# Patient Record
Sex: MALE | Race: WHITE | NOT HISPANIC OR LATINO | Employment: FULL TIME | URBAN - METROPOLITAN AREA
[De-identification: names, ages, dates, MRNs, and addresses within clinical notes are randomized per-mention and may not be internally consistent; named-entity substitution may affect disease eponyms.]

---

## 2018-11-06 ENCOUNTER — HISTORICAL (OUTPATIENT)
Dept: ADMINISTRATIVE | Facility: HOSPITAL | Age: 48
End: 2018-11-06

## 2018-11-06 LAB
ABS NEUT (OLG): 5.3
ALBUMIN SERPL-MCNC: 4.5 GM/DL (ref 3.4–5)
ALBUMIN/GLOB SERPL: 1.61 {RATIO} (ref 1.5–2.5)
ALP SERPL-CCNC: 49 UNIT/L (ref 38–126)
ALT SERPL-CCNC: 20 UNIT/L (ref 7–52)
AST SERPL-CCNC: 17 UNIT/L (ref 15–37)
BILIRUB SERPL-MCNC: 0.7 MG/DL (ref 0.2–1)
BILIRUBIN DIRECT+TOT PNL SERPL-MCNC: 0.2 MG/DL (ref 0–0.5)
BILIRUBIN DIRECT+TOT PNL SERPL-MCNC: 0.5 MG/DL
BUN SERPL-MCNC: 24 MG/DL (ref 7–18)
CALCIUM SERPL-MCNC: 9.2 MG/DL (ref 8.5–10)
CHLORIDE SERPL-SCNC: 104 MMOL/L (ref 98–107)
CHOLEST SERPL-MCNC: 209 MG/DL (ref 0–200)
CHOLEST/HDLC SERPL: 4.8 {RATIO}
CO2 SERPL-SCNC: 27 MMOL/L (ref 21–32)
CREAT SERPL-MCNC: 1.05 MG/DL (ref 0.6–1.3)
ERYTHROCYTE [DISTWIDTH] IN BLOOD BY AUTOMATED COUNT: 12.3 % (ref 11.5–17)
EST. AVERAGE GLUCOSE BLD GHB EST-MCNC: 103 MG/DL
GLOBULIN SER-MCNC: 2.8 GM/DL (ref 1.2–3)
GLUCOSE SERPL-MCNC: 123 MG/DL (ref 74–106)
HBA1C MFR BLD: 5.2 % (ref 4.4–6.4)
HCT VFR BLD AUTO: 47.4 % (ref 42–52)
HDLC SERPL-MCNC: 44 MG/DL (ref 35–60)
HGB BLD-MCNC: 15.6 GM/DL (ref 14–18)
LDLC SERPL CALC-MCNC: 129 MG/DL (ref 0–129)
LYMPHOCYTES # BLD AUTO: 2.6 X10(3)/MCL (ref 0.6–3.4)
LYMPHOCYTES NFR BLD AUTO: 28.9 % (ref 13–40)
MCH RBC QN AUTO: 31.5 PG (ref 27–31.2)
MCHC RBC AUTO-ENTMCNC: 33 GM/DL (ref 32–36)
MCV RBC AUTO: 96 FL (ref 80–94)
MONOCYTES # BLD AUTO: 1 X10(3)/MCL (ref 0–1.8)
MONOCYTES NFR BLD AUTO: 10.8 % (ref 0.1–24)
NEUTROPHILS NFR BLD AUTO: 60.3 % (ref 47–80)
PLATELET # BLD AUTO: 244 X10(3)/MCL (ref 130–400)
PMV BLD AUTO: 7.7 FL
POTASSIUM SERPL-SCNC: 4.7 MMOL/L (ref 3.5–5.1)
PROT SERPL-MCNC: 7.3 GM/DL (ref 6.4–8.2)
PSA SERPL-MCNC: 0 NG/ML (ref 0–2.5)
RBC # BLD AUTO: 4.95 X10(6)/MCL (ref 4.7–6.1)
SODIUM SERPL-SCNC: 137 MMOL/L (ref 136–145)
TRIGL SERPL-MCNC: 212 MG/DL (ref 30–150)
TSH SERPL-ACNC: 0.02 MIU/ML (ref 0.35–4.94)
VLDLC SERPL CALC-MCNC: 42.4 MG/DL
WBC # SPEC AUTO: 8.9 X10(3)/MCL (ref 4.5–11.5)

## 2019-02-04 ENCOUNTER — HISTORICAL (OUTPATIENT)
Dept: ADMINISTRATIVE | Facility: HOSPITAL | Age: 49
End: 2019-02-04

## 2019-02-04 LAB
APPEARANCE, UA: CLEAR
BACTERIA #/AREA URNS AUTO: NORMAL /HPF
BILIRUB UR QL STRIP: NEGATIVE MG/DL
CHOLEST SERPL-MCNC: 225 MG/DL (ref 0–200)
CHOLEST/HDLC SERPL: 4.5 {RATIO}
COLOR UR: YELLOW
GLUCOSE (UA): NEGATIVE MG/DL
HDLC SERPL-MCNC: 50 MG/DL (ref 35–60)
HGB UR QL STRIP: NEGATIVE UNIT/L
KETONES UR QL STRIP: NEGATIVE MG/DL
LDLC SERPL CALC-MCNC: 141 MG/DL (ref 0–129)
LEUKOCYTE ESTERASE UR QL STRIP: NEGATIVE UNIT/L
NITRITE UR QL STRIP.AUTO: NEGATIVE
PH UR STRIP: 6 [PH]
PROT UR QL STRIP: NEGATIVE MG/DL
RBC #/AREA URNS HPF: NORMAL /HPF
SP GR UR STRIP: 1.02
SQUAMOUS EPITHELIAL, UA: NORMAL /LPF
T3FREE SERPL-MCNC: 2.91 PG/ML (ref 1.45–3.48)
T4 FREE SERPL-MCNC: 0.88 NG/DL (ref 0.76–1.46)
TRIGL SERPL-MCNC: 207 MG/DL (ref 30–150)
TSH SERPL-ACNC: 1.97 MIU/ML (ref 0.35–4.94)
UROBILINOGEN UR STRIP-ACNC: 0.2 MG/DL
VLDLC SERPL CALC-MCNC: 41.4 MG/DL
WBC #/AREA URNS AUTO: NORMAL /[HPF]

## 2020-01-29 ENCOUNTER — HISTORICAL (OUTPATIENT)
Dept: ADMINISTRATIVE | Facility: HOSPITAL | Age: 50
End: 2020-01-29

## 2020-01-29 LAB
ABS NEUT (OLG): 6.4 X10(3)/MCL (ref 2.1–9.2)
ALBUMIN SERPL-MCNC: 5 GM/DL (ref 3.4–5)
ALBUMIN/GLOB SERPL: 1.79 {RATIO} (ref 1.5–2.5)
ALP SERPL-CCNC: 44 UNIT/L (ref 38–126)
ALT SERPL-CCNC: 22 UNIT/L (ref 7–52)
APPEARANCE, UA: CLEAR
AST SERPL-CCNC: 20 UNIT/L (ref 15–37)
BACTERIA #/AREA URNS AUTO: NORMAL /HPF
BILIRUB SERPL-MCNC: 0.7 MG/DL (ref 0.2–1)
BILIRUB UR QL STRIP: NEGATIVE MG/DL
BILIRUBIN DIRECT+TOT PNL SERPL-MCNC: 0.1 MG/DL (ref 0–0.5)
BILIRUBIN DIRECT+TOT PNL SERPL-MCNC: 0.6 MG/DL
BUN SERPL-MCNC: 20 MG/DL (ref 7–18)
CALCIUM SERPL-MCNC: 9.5 MG/DL (ref 8.5–10)
CHLORIDE SERPL-SCNC: 100 MMOL/L (ref 98–107)
CHOLEST SERPL-MCNC: 201 MG/DL (ref 0–200)
CHOLEST/HDLC SERPL: 3.3 {RATIO}
CO2 SERPL-SCNC: 29 MMOL/L (ref 21–32)
COLOR UR: YELLOW
CREAT SERPL-MCNC: 0.95 MG/DL (ref 0.6–1.3)
DEPRECATED CALCIDIOL+CALCIFEROL SERPL-MC: 32.3 NG/ML (ref 30–80)
ERYTHROCYTE [DISTWIDTH] IN BLOOD BY AUTOMATED COUNT: 12.1 % (ref 11.5–17)
GLOBULIN SER-MCNC: 2.8 GM/DL (ref 1.2–3)
GLUCOSE (UA): NEGATIVE MG/DL
GLUCOSE SERPL-MCNC: 109 MG/DL (ref 74–106)
HCT VFR BLD AUTO: 46.7 % (ref 42–52)
HDLC SERPL-MCNC: 61 MG/DL (ref 35–60)
HGB BLD-MCNC: 15.7 GM/DL (ref 14–18)
HGB UR QL STRIP: NEGATIVE UNIT/L
KETONES UR QL STRIP: NORMAL MG/DL
LDLC SERPL CALC-MCNC: 119 MG/DL (ref 0–129)
LEUKOCYTE ESTERASE UR QL STRIP: NEGATIVE UNIT/L
LYMPHOCYTES # BLD AUTO: 2.7 X10(3)/MCL (ref 0.6–3.4)
LYMPHOCYTES NFR BLD AUTO: 27.4 % (ref 13–40)
MCH RBC QN AUTO: 30.4 PG (ref 27–31.2)
MCHC RBC AUTO-ENTMCNC: 34 GM/DL (ref 32–36)
MCV RBC AUTO: 90 FL (ref 80–94)
MONOCYTES # BLD AUTO: 0.8 X10(3)/MCL (ref 0.1–1.3)
MONOCYTES NFR BLD AUTO: 8 % (ref 0.1–24)
NEUTROPHILS NFR BLD AUTO: 64.6 % (ref 47–80)
NITRITE UR QL STRIP.AUTO: NEGATIVE
PH UR STRIP: 6.5 [PH]
PLATELET # BLD AUTO: 286 X10(3)/MCL (ref 130–400)
PMV BLD AUTO: 7.6 FL (ref 9.4–12.4)
POTASSIUM SERPL-SCNC: 5 MMOL/L (ref 3.5–5.1)
PROT SERPL-MCNC: 7.8 GM/DL (ref 6.4–8.2)
PROT UR QL STRIP: NEGATIVE MG/DL
PSA SERPL-MCNC: 0.9 NG/ML (ref 0–2.5)
RBC # BLD AUTO: 5.16 X10(6)/MCL (ref 4.7–6.1)
RBC #/AREA URNS HPF: NORMAL /HPF
SODIUM SERPL-SCNC: 137 MMOL/L (ref 136–145)
SP GR UR STRIP: 1.02
SQUAMOUS EPITHELIAL, UA: NORMAL /LPF
TESTOST SERPL-MCNC: 254 NG/DL (ref 300–1060)
TRIGL SERPL-MCNC: 114 MG/DL (ref 30–150)
TSH SERPL-ACNC: 1.43 MIU/ML (ref 0.35–4.94)
UROBILINOGEN UR STRIP-ACNC: 0.2 MG/DL
VLDLC SERPL CALC-MCNC: 22.8 MG/DL
WBC # SPEC AUTO: 9.9 X10(3)/MCL (ref 4.5–11.5)
WBC #/AREA URNS AUTO: NORMAL /[HPF]

## 2020-08-20 ENCOUNTER — HISTORICAL (OUTPATIENT)
Dept: ADMINISTRATIVE | Facility: HOSPITAL | Age: 50
End: 2020-08-20

## 2020-08-20 LAB
ALBUMIN SERPL-MCNC: 4.6 GM/DL (ref 3.4–5)
ALBUMIN/GLOB SERPL: 2 {RATIO} (ref 1.5–2.5)
ALP SERPL-CCNC: 41 UNIT/L (ref 38–126)
ALT SERPL-CCNC: 24 UNIT/L (ref 7–52)
AST SERPL-CCNC: 20 UNIT/L (ref 15–37)
BILIRUB SERPL-MCNC: 0.7 MG/DL (ref 0.2–1)
BILIRUBIN DIRECT+TOT PNL SERPL-MCNC: 0.2 MG/DL (ref 0–0.5)
BILIRUBIN DIRECT+TOT PNL SERPL-MCNC: 0.5 MG/DL
BUN SERPL-MCNC: 20 MG/DL (ref 7–18)
CALCIUM SERPL-MCNC: 9.3 MG/DL (ref 8.5–10.1)
CHLORIDE SERPL-SCNC: 103 MMOL/L (ref 98–107)
CO2 SERPL-SCNC: 26 MMOL/L (ref 21–32)
CREAT SERPL-MCNC: 1.07 MG/DL (ref 0.6–1.3)
DEPRECATED CALCIDIOL+CALCIFEROL SERPL-MC: 57.5 NG/ML (ref 30–80)
GLOBULIN SER-MCNC: 2.3 GM/DL (ref 1.2–3)
GLUCOSE SERPL-MCNC: 119 MG/DL (ref 74–106)
POTASSIUM SERPL-SCNC: 4.8 MMOL/L (ref 3.5–5.1)
PROT SERPL-MCNC: 6.9 GM/DL (ref 6.4–8.2)
SODIUM SERPL-SCNC: 136 MMOL/L (ref 136–145)

## 2020-08-26 ENCOUNTER — HISTORICAL (OUTPATIENT)
Dept: ADMINISTRATIVE | Facility: HOSPITAL | Age: 50
End: 2020-08-26

## 2020-08-26 LAB — TESTOST SERPL-MCNC: 280 NG/DL (ref 300–1060)

## 2020-11-12 ENCOUNTER — HISTORICAL (OUTPATIENT)
Dept: ADMINISTRATIVE | Facility: HOSPITAL | Age: 50
End: 2020-11-12

## 2020-11-12 LAB
ABS NEUT (OLG): 5.4 X10(3)/MCL (ref 2.1–9.2)
ALBUMIN SERPL-MCNC: 4.4 GM/DL (ref 3.4–5)
ALBUMIN/GLOB SERPL: 1.76 {RATIO} (ref 1.5–2.5)
ALP SERPL-CCNC: 48 UNIT/L (ref 38–126)
ALT SERPL-CCNC: 29 UNIT/L (ref 7–52)
AST SERPL-CCNC: 22 UNIT/L (ref 15–37)
BILIRUB SERPL-MCNC: 0.4 MG/DL (ref 0.2–1)
BILIRUBIN DIRECT+TOT PNL SERPL-MCNC: 0.1 MG/DL (ref 0–0.5)
BILIRUBIN DIRECT+TOT PNL SERPL-MCNC: 0.3 MG/DL
BUN SERPL-MCNC: 20 MG/DL (ref 7–18)
CALCIUM SERPL-MCNC: 9.2 MG/DL (ref 8.5–10.1)
CHLORIDE SERPL-SCNC: 104 MMOL/L (ref 98–107)
CHOLEST SERPL-MCNC: 184 MG/DL (ref 0–200)
CHOLEST/HDLC SERPL: 4.6 {RATIO}
CO2 SERPL-SCNC: 24 MMOL/L (ref 21–32)
CREAT SERPL-MCNC: 0.85 MG/DL (ref 0.6–1.3)
ERYTHROCYTE [DISTWIDTH] IN BLOOD BY AUTOMATED COUNT: 12.3 % (ref 11.5–17)
GLOBULIN SER-MCNC: 2.5 GM/DL (ref 1.2–3)
GLUCOSE SERPL-MCNC: 100 MG/DL (ref 74–106)
HCT VFR BLD AUTO: 44.8 % (ref 42–52)
HDLC SERPL-MCNC: 40 MG/DL (ref 35–60)
HGB BLD-MCNC: 15 GM/DL (ref 14–18)
LDLC SERPL CALC-MCNC: 128 MG/DL (ref 0–129)
LYMPHOCYTES # BLD AUTO: 2.7 X10(3)/MCL (ref 0.6–3.4)
LYMPHOCYTES NFR BLD AUTO: 30.5 % (ref 13–40)
MCH RBC QN AUTO: 31 PG (ref 27–31.2)
MCHC RBC AUTO-ENTMCNC: 34 GM/DL (ref 32–36)
MCV RBC AUTO: 93 FL (ref 80–94)
MONOCYTES # BLD AUTO: 0.8 X10(3)/MCL (ref 0.1–1.3)
MONOCYTES NFR BLD AUTO: 8.7 % (ref 0.1–24)
NEUTROPHILS NFR BLD AUTO: 60.8 % (ref 47–80)
PLATELET # BLD AUTO: 264 X10(3)/MCL (ref 130–400)
PMV BLD AUTO: 7.8 FL (ref 9.4–12.4)
POTASSIUM SERPL-SCNC: 4.7 MMOL/L (ref 3.5–5.1)
PROT SERPL-MCNC: 6.9 GM/DL (ref 6.4–8.2)
RBC # BLD AUTO: 4.84 X10(6)/MCL (ref 4.7–6.1)
SODIUM SERPL-SCNC: 138 MMOL/L (ref 136–145)
TESTOST SERPL-MCNC: 809 NG/DL (ref 300–1060)
TRIGL SERPL-MCNC: 136 MG/DL (ref 30–150)
VLDLC SERPL CALC-MCNC: 27.2 MG/DL
WBC # SPEC AUTO: 8.9 X10(3)/MCL (ref 4.5–11.5)

## 2021-03-02 ENCOUNTER — HISTORICAL (OUTPATIENT)
Dept: ADMINISTRATIVE | Facility: HOSPITAL | Age: 51
End: 2021-03-02

## 2021-03-02 LAB
ABS NEUT (OLG): 5 X10(3)/MCL (ref 2.1–9.2)
ALBUMIN SERPL-MCNC: 4.2 GM/DL (ref 3.4–5)
ALBUMIN/GLOB SERPL: 1.75 {RATIO} (ref 1.5–2.5)
ALP SERPL-CCNC: 41 UNIT/L (ref 38–126)
ALT SERPL-CCNC: 27 UNIT/L (ref 7–52)
AST SERPL-CCNC: 27 UNIT/L (ref 15–37)
BILIRUB SERPL-MCNC: 0.7 MG/DL (ref 0.2–1)
BILIRUBIN DIRECT+TOT PNL SERPL-MCNC: 0.1 MG/DL (ref 0–0.5)
BILIRUBIN DIRECT+TOT PNL SERPL-MCNC: 0.6 MG/DL
BUN SERPL-MCNC: 18 MG/DL (ref 7–18)
CALCIUM SERPL-MCNC: 9.7 MG/DL (ref 8.5–10.1)
CHLORIDE SERPL-SCNC: 100 MMOL/L (ref 98–107)
CHOLEST SERPL-MCNC: 169 MG/DL (ref 0–200)
CHOLEST/HDLC SERPL: 3.9 {RATIO}
CO2 SERPL-SCNC: 26 MMOL/L (ref 21–32)
CREAT SERPL-MCNC: 1.1 MG/DL (ref 0.6–1.3)
DEPRECATED CALCIDIOL+CALCIFEROL SERPL-MC: 28.7 NG/ML (ref 30–80)
ERYTHROCYTE [DISTWIDTH] IN BLOOD BY AUTOMATED COUNT: 12.8 % (ref 11.5–17)
GLOBULIN SER-MCNC: 2.4 GM/DL (ref 1.2–3)
GLUCOSE SERPL-MCNC: 100 MG/DL (ref 74–106)
HCT VFR BLD AUTO: 46.2 % (ref 42–52)
HDLC SERPL-MCNC: 43 MG/DL (ref 35–60)
HGB BLD-MCNC: 15.3 GM/DL (ref 14–18)
LDLC SERPL CALC-MCNC: 107 MG/DL (ref 0–129)
LYMPHOCYTES # BLD AUTO: 2.7 X10(3)/MCL (ref 0.6–3.4)
LYMPHOCYTES NFR BLD AUTO: 32 % (ref 13–40)
MCH RBC QN AUTO: 30 PG (ref 27–31.2)
MCHC RBC AUTO-ENTMCNC: 33 GM/DL (ref 32–36)
MCV RBC AUTO: 91 FL (ref 80–94)
MONOCYTES # BLD AUTO: 0.6 X10(3)/MCL (ref 0.1–1.3)
MONOCYTES NFR BLD AUTO: 7.4 % (ref 0.1–24)
NEUTROPHILS NFR BLD AUTO: 60.6 % (ref 47–80)
PLATELET # BLD AUTO: 243 X10(3)/MCL (ref 130–400)
PMV BLD AUTO: 8.1 FL (ref 9.4–12.4)
POTASSIUM SERPL-SCNC: 4.7 MMOL/L (ref 3.5–5.1)
PROT SERPL-MCNC: 6.6 GM/DL (ref 6.4–8.2)
PSA SERPL-MCNC: 0.93 NG/ML (ref 0–3.5)
RBC # BLD AUTO: 5.1 X10(6)/MCL (ref 4.7–6.1)
SODIUM SERPL-SCNC: 136 MMOL/L (ref 136–145)
TESTOST SERPL-MCNC: 669 NG/DL (ref 300–1060)
TRIGL SERPL-MCNC: 124 MG/DL (ref 30–150)
TSH SERPL-ACNC: 1.73 MIU/ML (ref 0.35–4.94)
VLDLC SERPL CALC-MCNC: 24.8 MG/DL
WBC # SPEC AUTO: 8.3 X10(3)/MCL (ref 4.5–11.5)

## 2021-03-03 ENCOUNTER — HISTORICAL (OUTPATIENT)
Dept: ADMINISTRATIVE | Facility: HOSPITAL | Age: 51
End: 2021-03-03

## 2021-03-03 LAB
APPEARANCE, UA: CLEAR
BACTERIA #/AREA URNS AUTO: ABNORMAL /HPF
BILIRUB UR QL STRIP: ABNORMAL MG/DL
COLOR UR: YELLOW
GLUCOSE (UA): NEGATIVE MG/DL
HGB UR QL STRIP: ABNORMAL UNIT/L
KETONES UR QL STRIP: ABNORMAL MG/DL
LEUKOCYTE ESTERASE UR QL STRIP: NEGATIVE UNIT/L
NITRITE UR QL STRIP.AUTO: NEGATIVE
PH UR STRIP: 7 [PH]
PROT UR QL STRIP: NEGATIVE MG/DL
RBC #/AREA URNS HPF: ABNORMAL /HPF
SP GR UR STRIP: >1.03
SQUAMOUS EPITHELIAL, UA: ABNORMAL /LPF
UROBILINOGEN UR STRIP-ACNC: 1 MG/DL
WBC #/AREA URNS AUTO: ABNORMAL /[HPF]

## 2021-09-07 ENCOUNTER — HISTORICAL (OUTPATIENT)
Dept: ADMINISTRATIVE | Facility: HOSPITAL | Age: 51
End: 2021-09-07

## 2021-09-07 LAB
ABS NEUT (OLG): 5.3 X10(3)/MCL (ref 2.1–9.2)
ALBUMIN SERPL-MCNC: 4.5 GM/DL (ref 3.4–5)
ALBUMIN/GLOB SERPL: 1.67 {RATIO} (ref 1.5–2.5)
ALP SERPL-CCNC: 47 UNIT/L (ref 38–126)
ALT SERPL-CCNC: 19 UNIT/L (ref 7–52)
AST SERPL-CCNC: 18 UNIT/L (ref 15–37)
BILIRUB SERPL-MCNC: 0.5 MG/DL (ref 0.2–1)
BILIRUBIN DIRECT+TOT PNL SERPL-MCNC: 0.1 MG/DL (ref 0–0.5)
BILIRUBIN DIRECT+TOT PNL SERPL-MCNC: 0.4 MG/DL
BUN SERPL-MCNC: 22 MG/DL (ref 7–18)
CALCIUM SERPL-MCNC: 9.8 MG/DL (ref 8.5–10.1)
CHLORIDE SERPL-SCNC: 102 MMOL/L (ref 98–107)
CHOLEST SERPL-MCNC: 201 MG/DL (ref 0–200)
CHOLEST/HDLC SERPL: 3.9 {RATIO}
CO2 SERPL-SCNC: 27 MMOL/L (ref 21–32)
CREAT SERPL-MCNC: 0.95 MG/DL (ref 0.6–1.3)
DEPRECATED CALCIDIOL+CALCIFEROL SERPL-MC: 47.6 NG/ML (ref 30–80)
ERYTHROCYTE [DISTWIDTH] IN BLOOD BY AUTOMATED COUNT: 13 % (ref 11.5–17)
GLOBULIN SER-MCNC: 2.7 GM/DL (ref 1.2–3)
GLUCOSE SERPL-MCNC: 123 MG/DL (ref 74–106)
HCT VFR BLD AUTO: 44.5 % (ref 42–52)
HDLC SERPL-MCNC: 51 MG/DL (ref 35–60)
HGB BLD-MCNC: 15.3 GM/DL (ref 14–18)
LDLC SERPL CALC-MCNC: 118 MG/DL (ref 0–129)
LYMPHOCYTES # BLD AUTO: 2.1 X10(3)/MCL (ref 0.6–3.4)
LYMPHOCYTES NFR BLD AUTO: 26.2 % (ref 13–40)
MCH RBC QN AUTO: 31.4 PG (ref 27–31.2)
MCHC RBC AUTO-ENTMCNC: 34 GM/DL (ref 32–36)
MCV RBC AUTO: 91 FL (ref 80–94)
MONOCYTES # BLD AUTO: 0.8 X10(3)/MCL (ref 0.1–1.3)
MONOCYTES NFR BLD AUTO: 9.5 % (ref 0.1–24)
NEUTROPHILS NFR BLD AUTO: 64.3 % (ref 47–80)
PLATELET # BLD AUTO: 248 X10(3)/MCL (ref 130–400)
PMV BLD AUTO: 7.9 FL (ref 9.4–12.4)
POTASSIUM SERPL-SCNC: 5 MMOL/L (ref 3.5–5.1)
PROT SERPL-MCNC: 7.2 GM/DL (ref 6.4–8.2)
PSA SERPL-MCNC: 0.72 NG/ML (ref 0–3.5)
RBC # BLD AUTO: 4.87 X10(6)/MCL (ref 4.7–6.1)
SODIUM SERPL-SCNC: 138 MMOL/L (ref 136–145)
TESTOST SERPL-MCNC: 237 NG/DL (ref 300–1060)
TRIGL SERPL-MCNC: 198 MG/DL (ref 30–150)
VLDLC SERPL CALC-MCNC: 39.6 MG/DL
WBC # SPEC AUTO: 8.2 X10(3)/MCL (ref 4.5–11.5)

## 2021-11-15 ENCOUNTER — HISTORICAL (OUTPATIENT)
Dept: ADMINISTRATIVE | Facility: HOSPITAL | Age: 51
End: 2021-11-15

## 2021-11-15 LAB
ABS NEUT (OLG): 6.9 X10(3)/MCL (ref 2.1–9.2)
ALBUMIN SERPL-MCNC: 5.1 GM/DL (ref 3.4–5)
ALBUMIN/GLOB SERPL: 1.96 {RATIO} (ref 1.5–2.5)
ALP SERPL-CCNC: 50 UNIT/L (ref 38–126)
ALT SERPL-CCNC: 25 UNIT/L (ref 7–52)
AST SERPL-CCNC: 25 UNIT/L (ref 15–37)
BILIRUB SERPL-MCNC: 0.7 MG/DL (ref 0.2–1)
BILIRUBIN DIRECT+TOT PNL SERPL-MCNC: 0.2 MG/DL (ref 0–0.5)
BILIRUBIN DIRECT+TOT PNL SERPL-MCNC: 0.5 MG/DL
BUN SERPL-MCNC: 17 MG/DL (ref 7–18)
CALCIUM SERPL-MCNC: 10.2 MG/DL (ref 8.5–10.1)
CHLORIDE SERPL-SCNC: 102 MMOL/L (ref 98–107)
CO2 SERPL-SCNC: 26 MMOL/L (ref 21–32)
CREAT SERPL-MCNC: 0.88 MG/DL (ref 0.6–1.3)
ERYTHROCYTE [DISTWIDTH] IN BLOOD BY AUTOMATED COUNT: 12.1 % (ref 11.5–17)
GLOBULIN SER-MCNC: 2.6 GM/DL (ref 1.2–3)
GLUCOSE SERPL-MCNC: 107 MG/DL (ref 74–106)
HCT VFR BLD AUTO: 46 % (ref 42–52)
HGB BLD-MCNC: 15.3 GM/DL (ref 14–18)
LYMPHOCYTES # BLD AUTO: 2.6 X10(3)/MCL (ref 0.6–3.4)
LYMPHOCYTES NFR BLD AUTO: 25.2 % (ref 13–40)
MCH RBC QN AUTO: 30.7 PG (ref 27–31.2)
MCHC RBC AUTO-ENTMCNC: 33 GM/DL (ref 32–36)
MCV RBC AUTO: 92 FL (ref 80–94)
MONOCYTES # BLD AUTO: 0.7 X10(3)/MCL (ref 0.1–1.3)
MONOCYTES NFR BLD AUTO: 7.1 % (ref 0.1–24)
NEUTROPHILS NFR BLD AUTO: 67.7 % (ref 47–80)
PLATELET # BLD AUTO: 296 X10(3)/MCL (ref 130–400)
PMV BLD AUTO: 7.5 FL (ref 9.4–12.4)
POTASSIUM SERPL-SCNC: 5.1 MMOL/L (ref 3.5–5.1)
PROT SERPL-MCNC: 7.7 GM/DL (ref 6.4–8.2)
PSA SERPL-MCNC: 1.01 NG/ML (ref 0–3.5)
RBC # BLD AUTO: 4.99 X10(6)/MCL (ref 4.7–6.1)
SODIUM SERPL-SCNC: 139 MMOL/L (ref 136–145)
TESTOST SERPL-MCNC: 790 NG/DL (ref 300–1060)
WBC # SPEC AUTO: 10.2 X10(3)/MCL (ref 4.5–11.5)

## 2021-12-27 ENCOUNTER — HISTORICAL (OUTPATIENT)
Dept: ADMINISTRATIVE | Facility: HOSPITAL | Age: 51
End: 2021-12-27

## 2021-12-27 LAB
ALBUMIN SERPL-MCNC: 5 GM/DL (ref 3.4–5)
ALBUMIN/GLOB SERPL: 2 {RATIO} (ref 1.5–2.5)
ALP SERPL-CCNC: 49 UNIT/L (ref 38–126)
ALT SERPL-CCNC: 22 UNIT/L (ref 7–52)
AST SERPL-CCNC: 19 UNIT/L (ref 15–37)
BILIRUB SERPL-MCNC: 0.7 MG/DL (ref 0.2–1)
BILIRUBIN DIRECT+TOT PNL SERPL-MCNC: 0.2 MG/DL (ref 0–0.5)
BILIRUBIN DIRECT+TOT PNL SERPL-MCNC: 0.5 MG/DL
BUN SERPL-MCNC: 22 MG/DL (ref 7–18)
CALCIUM SERPL-MCNC: 10.1 MG/DL (ref 8.5–10.1)
CHLORIDE SERPL-SCNC: 102 MMOL/L (ref 98–107)
CO2 SERPL-SCNC: 28 MMOL/L (ref 21–32)
CREAT SERPL-MCNC: 0.9 MG/DL (ref 0.6–1.3)
EST CREAT CLEARANCE SER (OHS): 171.27 ML/MIN
GLOBULIN SER-MCNC: 2.5 GM/DL (ref 1.2–3)
GLUCOSE SERPL-MCNC: 100 MG/DL (ref 74–106)
POTASSIUM SERPL-SCNC: 4.7 MMOL/L (ref 3.5–5.1)
PROT SERPL-MCNC: 7.5 GM/DL (ref 6.4–8.2)
SODIUM SERPL-SCNC: 140 MMOL/L (ref 136–145)

## 2022-04-09 ENCOUNTER — HISTORICAL (OUTPATIENT)
Dept: ADMINISTRATIVE | Facility: HOSPITAL | Age: 52
End: 2022-04-09

## 2022-04-29 VITALS
SYSTOLIC BLOOD PRESSURE: 131 MMHG | BODY MASS INDEX: 35.28 KG/M2 | WEIGHT: 274.94 LBS | HEIGHT: 74 IN | DIASTOLIC BLOOD PRESSURE: 88 MMHG

## 2022-05-02 NOTE — HISTORICAL [facility]
This is a historical note converted from Ricarda. Formatting and pictures may have been removed.  Please reference Ricarda for original formatting and attached multimedia. Chief Complaint  6 MONTH FOLLOW UP  History of Present Illness  Patient here for six-month follow-up.? Denies any side effects to current medications.? Denies change in social or family history.? No new complaints.  /78 at home yesterday, has been running normal. Drank a Monster Drink this morning. Nicotine : 1/2 can dip a day.? No recent exercise due to COVID.  Still with work stress.?  Doesnt feel the Clomid helped.  Review of Systems  General:??????????????? Patient reports energy level is good.??Denies fever,chills, night sweats, fatigue or weakness.  HEENT:?????????????????? Denies vision changes or eye pain.? No sore throat, ear pain, sinus pressure or discharge.  Cardiovascular:??? Denies chest pain, palpitations, dyspnea on exertion, orthopnea.  Respiratory:???????? No cough, wheezing, shortness of breath, or sputum.  GI:????????????????????????? Denies nausea, emesis, constipation, diarrhea, melena, hematochezia or abdominal pain  :??????????????????????? No frequency, urgency, hematuria, discharge, or incontinence  MS:?????????????????????? Denies myalgias, arthralgias, joint effusion, edema, or weakness  Neuro:????????????????? No headaches, numbness in extremities, tingling, dizziness, or weakness  Psych:?????????????????? Denies anxiety, depression, suicidal or homicidal ideations, or irritability  Endo:??????????????????? Denies polyuria, polydipsia, polyphagia  Heme:????????????????? No abnormal bleeding or bruising. No lymph node enlargement or pain.  Physical Exam  Vitals & Measurements  HR:?98(Peripheral)? BP:?150/98?  HT:?187.00?cm? WT:?129.300?kg? BMI:?36.98?  138/88 on my check  General :?????Well-developed and? nourished, no apparent distress, alert and oriented ?4  HEENT:????? TMs and EACs within normal limits,?nasal  mucosa within normal limits?with no discharge,?oropharynx clear  Integument :????? Skin is intact with no erythema.? No pustules or vesicles.? No rash or scale. No Lymphadenopathy.? No urticaria.? No abnormal nevi  Neck :?????Supple, no lymphadenopathy, no thyromegaly, no bruits, no jugular venous distention  Cardiovascular :?????? Regular rhythm and rate, no murmurs, radial and dorsal pedal pulses 2+ bilaterally  Respiratory :??????Lungs clear to auscultation bilaterally, no wheezes, no crackles, no rhonchi.? Good air movement  Abdomen :?????? ?NABS, soft, nontender, no hepatosplenomegaly, no masses, no guarding or rebound????????????????????????  Ext:??????? No muscle tenderness, joints WNL, FROM, negative SLR, no?CCE  Neuro :? ?????? CN II-XII intact, reflexes 2+ throughout, no motor sensory deficits, negative?cerebellar? tests  Heme :?????? No bruising or petechiae?  Back:??????? no CVAT  Assessment/Plan  1.?HTN (hypertension)?I10  ?We will continue valsartan 160?for 6 months.? He will monitor blood pressure at home and contact us if it is consistently greater than 140/90?or if he is symptomatic.  2.?Hypertriglyceridemia?E78.1  ?He will continue omega-3s  3.?Anxiety?F41.9  Medication has been effective.??Refill duloxetine 60 mg.  4.?Vitamin D deficiency?E55.9  ?His vitamin D level?is? now in the desired  5.?Hypogonadism male?E29.1  We will check a?testosterone level.  He is encouraged to decrease?carbohydrate intake. ?We will check an A1c at his next CPX.  Referrals  Clinic Follow up, *Est. 03/03/21 8:00:00 CST, CPX in 6 months, Order for future visit, Hypogonadism male, [facility]  Clinic Follow-up PRN, 08/26/20 11:08:00 CDT, [facility], Future Order   Problem List/Past Medical History  Ongoing  can lie flat  can walk 2 blocks w/o CP or SOB  hearing loss (rt)  Hypertriglyceridemia  Low TSH level  Wellness examination  Historical  paralymphatic fistula repair right ear 2012  torn Lt ACL  repair  Procedure/Surgical History  Arthroscopically aided anterior cruciate ligament repair/augmentation or reconstruction. (05/22/2014)  Arthroscopy ACL Reconstruction (Left) (05/22/2014)  Injection of anesthetic into peripheral nerve for analgesia (05/22/2014)  Injection, anesthetic agent; femoral nerve, single. (05/22/2014)  Other repair of the cruciate ligaments (05/22/2014)  Paralymphatic fistula repair (2012)  Rt acl reconstruction (1991)   Medications  busPIRone 7.5 mg oral tablet, 7.5 mg= 1 tab(s), Oral, TID, PRN, 2 refills,? ?Not taking  clomiPHENE 50 mg oral tablet, 50 mg= 1 tab(s), Oral, Daily,? 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  Father: History is negative  Brother: History is negative  Immunizations  Vaccine Date Status   influenza virus vaccine, inactivated 01/29/2020 Given   tetanus/diphtheria/pertussis, acel(Tdap) 01/29/2020 Given   Health Maintenance  Health Maintenance  ???Pending?(in the next year)  ??? ??OverDue  ??? ? ? ?Influenza Vaccine due??and every?  ??? ? ? ?Alcohol Misuse Screening due??01/02/20??and every 1??year(s)  ??? ??Due?  ??? ? ? ?ADL Screening due??09/03/20??and every  1??year(s)  ??? ? ? ?Aspirin Therapy for CVD Prevention due??09/03/20??and every 1??year(s)  ??? ? ? ?Depression Screening due??09/03/20??and every?  ??? ??Due In Future?  ??? ? ? ?Obesity Screening not due until??01/01/21??and every 1??year(s)  ??? ? ? ?Hypertension Management-BMP not due until??08/20/21??and every 1??year(s)  ??? ? ? ?Blood Pressure Screening not due until??08/26/21??and every 1??year(s)  ??? ? ? ?Body Mass Index Check not due until??08/26/21??and every 1??year(s)  ??? ? ? ?Hypertension Management-Blood Pressure not due until??08/26/21??and every 1??year(s)  ???Satisfied?(in the past 1 year)  ??? ??Satisfied?  ??? ? ? ?Blood Pressure Screening on??08/26/20.??Satisfied by Kathya Henley  ??? ? ? ?Body Mass Index Check on??08/26/20.??Satisfied by Kathya Henley  ??? ? ? ?Diabetes Screening on??08/20/20.??Satisfied by Cesia Regan  ??? ? ? ?Hypertension Management-Blood Pressure on??08/26/20.??Satisfied by Kathya Henley  ??? ? ? ?Influenza Vaccine on??01/29/20.??Satisfied by Daphne Delong CMA.  ??? ? ? ?Lipid Screening on??01/29/20.??Satisfied by Cesia Regan  ??? ? ? ?Obesity Screening on??08/26/20.??Satisfied by Kathya Henley  ??? ? ? ?Tetanus Vaccine on??01/29/20.??Satisfied by Daphne Delong CMA.  ?  Lab Results  Test Name Test Result Date/Time Comments   Glucose Lvl 119.0 mg/dL (High) 08/20/2020 08:03 CDT    BUN 20.0 mg/dL (High) 08/20/2020 08:03 CDT    Creatinine 1.07 mg/dL 08/20/2020 08:03 CDT    AST 20.0 unit/L 08/20/2020 08:03 CDT    ALT 24.0 unit/L 08/20/2020 08:03 CDT    Vit D 25 OH 57.5 ng/mL 08/20/2020 08:03 CDT Vitamin D Hydroxy Reference Range:  ? 0-17 years- Deficiency: less than 20 ng/ml  Optimum level: > or = 20 ng/ml  ? 18 yrs or older: Deficiency: less than 20 ng/ml  Insufficiency: 20 - 29 ng/ml  Optimum level: 30 - 80 ng/ml  Possible toxicity: > or = 150 ng/ml

## 2022-05-02 NOTE — HISTORICAL [facility]
This is a historical note converted from Ricarda. Formatting and pictures may have been removed.  Please reference Ricarda for original formatting and attached multimedia. Chief Complaint  CPX PT DECLINES FLU VACC  History of Present Illness  Patient presents for annual wellness exam.? Denies any change in social history or family history.? Reports to be tolerating prescribed medicines well. Denies any side effects. ?No new problems.? Fasting.? , 2 children.? 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 Manager for [employer]. Nicotine dip tobacco, wants to restart Chantix. Exercises 3 times per week. Started Atkins diet 10 days ago, has lost 4 lbs. Etoh 2 drinks 3 times per week.  Father 72 : healthy  Mother 71 :? RA  Brother 45 : healthy  Review of Systems  General:??????????????? Patient reports energy level is good. Denies weight change. ?Denies fever,chills, night sweats, fatigue or weakness.  Integument:???????? Denies any nevus changes, rashes, urticaria, ?or sores.? Also denies itching or areas of numbness.  HEENT:?????????????????? Denies vision changes or eye pain.? No sore throat, ear pain, sinus pressure or discharge.? 10 % hearing on Right, pretty bad on left too. Wears hearing aids  Cardiovascular:??? Denies chest pain, palpitations, dyspnea on exertion, orthopnea.  Respiratory:???????? No cough, wheezing, shortness of breath, or sputum.  GI:????????????????????????? Denies nausea, emesis, constipation, diarrhea, melena, hematochezia or abdominal pain  :??????????????????????? No frequency, urgency, hematuria, discharge, or incontinence  MS:?????????????????????? Denies myalgias, arthralgias, joint effusion, edema, or weakness  Neuro:????????????????? No headaches, numbness in extremities, tingling, dizziness, or weakness  Psych:?????????????????? Denies anxiety, depression, suicidal or homicidal ideations, or irritability  Endo:??????????????????? Denies polyuria, polydipsia,  polyphagia  Heme:????????????????? No abnormal bleeding or bruising. No lymph node enlargement or pain.  ?  ?  Physical Exam  Vitals & Measurements  HR:?68(Peripheral)? BP:?128/72?  HT:?187?cm? WT:?120?kg?  General :?????Well-developed and? nourished, no apparent distress, alert and oriented ?4  Integument :????? Skin is intact with no erythema.? No pustules or vesicles.? No rash or scale. No Lymphadenopathy.? No urticaria.? No abnormal nevi  HEENT :?????YADIRA, EOMI ; TMs and EACs clear, normal turbinates with no erythema, normal mucosa, no sinus tenderness; no erythema or exudate of mouth or pharynx  Neck :?????Supple, no lymphadenopathy, no thyromegaly, no bruits, no jugular venous distention  Cardiovascular :?????? Regular rhythm and rate, no murmurs, radial and dorsal pedal pulses 2+ bilaterally  Respiratory :??????Lungs clear to auscultation bilaterally, no wheezes, no crackles, no rhonchi.? Good air movement  Abdomen :?????? ?NABS, soft, nontender, no hepatosplenomegaly, no masses, no guarding or rebound??????????????????????????  MS :??????? No muscle tenderness, joints WNL, FROM, negative SLR, no?CCE  Neuro :? ?????? CN II-XII intact, reflexes 2+ throughout, no motor sensory deficits, negative?cerebellar? tests  Psych :??????Normal affect, patient calm, good historian, patient answers questions??? appropriately.????Good hygiene? ??  Heme :?????? No bruising or petechiae?  Assessment/Plan  1.?Wellness examination  ?Age-appropriate wellness topics discussed.? Encouraged to lose weight.? Will check labs today and follow-up to be determined. ?Labs: CBC, CMP, lipids, PSA, UA, TSH, and A1c.  Ordered:  CBC w/ Auto Diff, Routine collect, 11/06/18 8:34:00 CST, Blood, Order for future visit, Stop date 11/06/18 8:34:00 CST, Lab Collect, Wellness examination, 11/06/18 8:34:00 CST  Comprehensive Metabolic Panel, Routine collect, 11/06/18 8:34:00 CST, Blood, Order for future visit, Stop date 11/06/18 8:34:00 CST, Lab  Collect, Wellness examination, 11/06/18 8:34:00 CST  Lab Collection Request, 11/06/18 8:34:00 CST, [facility], 11/06/18 8:34:00 CST  Lipid Panel, Routine collect, 11/06/18 8:34:00 CST, Blood, Order for future visit, Stop date 11/06/18 8:34:00 CST, Lab Collect, Wellness examination, 11/06/18 8:34:00 CST  Preventative Health Care Est 40-64 years [id number] PC, Wellness examination  Anxiety  JOANIE on CPAP  Tobacco use, [facility], 11/06/18 8:31:00 CST  Thyroid Stimulating Hormone, Routine collect, 11/06/18 8:35:00 CST, Blood, Order for future visit, Stop date 11/06/18 8:35:00 CST, Lab Collect, Wellness examination  Anxiety, 11/06/18 8:35:00 CST  Urinalysis Complete no reflex, Routine collect, Urine, Order for future visit, 11/06/18 8:34:00 CST, Stop date 11/06/18 8:34:00 CST, Nurse collect, Wellness examination  ?  2.?Anxiety  ?Stable. ?Refilled?Wellbutrin for 1 year  Ordered:  Lab Collection Request, 11/06/18 8:34:00 CST, [facility], 11/06/18 8:34:00 CST  [facility] Care Est 40-64 years [id number] PC, Wellness examination  Anxiety  JOANIE on CPAP  Tobacco use, [facility], 11/06/18 8:31:00 CST  Thyroid Stimulating Hormone, Routine collect, 11/06/18 8:35:00 CST, Blood, Order for future visit, Stop date 11/06/18 8:35:00 CST, Lab Collect, Wellness examination  Anxiety, 11/06/18 8:35:00 CST  ?  3.?JOANIE on CPAP  ?Given hand written prescription for a new CPAP machine.? Will retest if?necessary.  Ordered:  Lab Collection Request, 11/06/18 8:34:00 CST, [facility], 11/06/18 8:34:00 CST  [facility] Health Care Est 40-64 years [id number] PC, Wellness examination  Anxiety  JOANIE on CPAP  Tobacco use, [facility] - [facility], 11/06/18 8:31:00 CST  ?  4.?Tobacco use  Patient givin?a starting?and continuing?pack prescription of Chantix  Ordered:  varenicline, 1 tab(s), Oral, BID, as directed on package labeling, # 53 tab(s), 0 Refill(s), Pharmacy: [facility]/pharmacy #[id number]  varenicline, 1 mg = 1 tab(s), Oral, BID, Start  after completing Starting Pack, # 56 tab(s), 1 Refill(s), Pharmacy: [facility]/pharmacy #[id number]  Lab Collection Request, 11/06/18 8:34:00 CST, [facility] - [facility], 11/06/18 8:34:00 CST  Preventative Health Care Est 40-64 years [address] PC, Wellness examination  Anxiety  JOANIE on CPAP  Tobacco use, [facility] - [facility], 11/06/18 8:31:00 CST  ?  5.?Prostate cancer screening  Ordered:  Lab Collection Request, 11/06/18 8:34:00 CST, [facility], 11/06/18 8:34:00 CST  Prostate Specific Antigen, Routine collect, 11/06/18 8:34:00 CST, Blood, Order for future visit, Stop date 11/06/18 8:34:00 CST, Lab Collect, Prostate cancer screening, 11/06/18 8:34:00 CST  ?  6.?Elevated glucose  Ordered:  Hemoglobin A1c, Routine collect, 11/06/18 8:34:00 CST, Blood, Order for future visit, Stop date 11/06/18 8:34:00 CST, Lab Collect, Elevated glucose, 11/06/18 8:34:00 CST  Lab Collection Request, 11/06/18 8:34:00 CST, [facility], 11/06/18 8:34:00 CST  ?  Orders:  buPROPion, 300 mg = 1 tab(s), Oral, Daily, # 90 tab(s), 3 Refill(s), Pharmacy: [facility]/pharmacy #[id number]  Clinic Follow-up PRN, 11/06/18 8:31:00 CST, [facility] - [facility], Future Order   Problem List/Past Medical History  Ongoing  can lie flat  can walk 2 blocks w/o CP or SOB  hearing loss (rt)  Historical  paralymphatic fistula repair right ear 2012  torn Lt ACL repair  Procedure/Surgical History  Arthroscopy ACL Reconstruction (Left) (05/22/2014)  Paralymphatic fistula repair (2012)  Rt acl reconstruction (1991)   Medications  buPROPion 300 mg/24 hours (XL) oral tablet, extended release, 300 mg= 1 tab(s), Oral, Daily, 3 refills  Chantix Continuing Month 1 mg oral tablet, 1 mg= 1 tab(s), Oral, BID, 1 refills  Chantix Starter Pack 0.5 mg-1 mg oral tablet, 1 tab(s), Oral, BID  Allergies  Minocin?(facial swelling)  Social History  Alcohol  Current, Beer, Daily, 05/19/2014  Employment/School  Employed, Work/School description: management., 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 ??Due?  ??? ? ? ?ADL Screening due??11/06/18??and every 1??year(s)  ??? ? ? ?Alcohol Misuse Screening due??11/06/18??and every 1??year(s)  ??? ? ? ?Aspirin Therapy for CVD Prevention due??11/06/18??and every 1??year(s)  ??? ? ? ?Obesity Screening due??11/06/18??and every 1??year(s)  ??? ? ? ?Tetanus Vaccine due??11/06/18??and every 10??year(s)  ???Satisfied?(in the past 1 year)  ??? ??Satisfied?  ??? ? ? ?Blood Pressure Screening on??11/06/18.??Satisfied by Vidhi Steven  ??? ? ? ?Influenza Vaccine on??11/06/18.??Satisfied by Vidhi Steven  ?  ?

## 2022-05-02 NOTE — HISTORICAL [facility] C[facility]
This is a historical note converted from Ricarda. Formatting and pictures may have been removed.  Please reference Ricarda for original formatting and attached multimedia. Chief Complaint  1 MTH RECHECK  History of Present Illness  Patient presents to follow-up regarding his high blood pressure and?anxiety. ?His home blood pressures have been normal. ?He denies any side effects with the increase in his amlodipine. ?He has started a diet called?Optavia.? He has lost 10 pounds in the past month.  ?  His anxiety is improved with Lexapro 10 mg but is interested in?a dose increase. ?He denies any SE with the medication. He denies any suicidal or harmful ideations.  Review of Systems  General:?? Reports intentional weight loss.??Denies fever,chills, night sweats, or weakness.  Cardiovascular:?? Denies chest pain, palpitations, dyspnea on exertion, orthopnea.  Respiratory:?? No cough, wheezing, shortness of breath, or sputum.  GI:?? Denies nausea, emesis, constipation, diarrhea, melena, hematochezia or abdominal pain  :?? No frequency, urgency, hematuria, discharge, or incontinence  Neuro:?? No headaches, numbness in extremities, tingling, dizziness, or weakness  Psych:?? See HPI  ?  Physical Exam  Vitals & Measurements  HR:?86(Peripheral)? BP:?131/88?  HT:?187?cm? HT:?187.00?cm? WT:?124.7?kg? WT:?124.700?kg? BMI:?35.66?  General:?? Well-developed and??nourished, no apparent distress, alert and oriented??4.  Neck:?? Supple, no lymphadenopathy, no thyromegaly, no bruits, no jugular venous distention.  Cardiovascular:?? Regular rhythm and rate, no murmurs, radial and dorsal pedal pulses 2+ bilaterally.  Respiratory:?? Lungs clear to auscultation bilaterally, no wheezes, no crackles, no rhonchi.??Good air movement.  Abdomen:?? NABS, soft, nontender, no hepatosplenomegaly, no masses, no guarding or rebound.?  MS:?? No CCE.?_  Neuro:?? CN II-XII intact_  Psych: Normal affect, patient calm, good historian, patient answers  questions?appropriately. Good hygiene.  Assessment/Plan  1.?HTN (hypertension)?I10  Controlled. ?Continue medications as prescribed.  Lab today: CMP?  CPX in 3 months.  Ordered:  Clinic Follow up, *Est. 03/27/22 3:00:00 CDT, Wellness, Order for future visit, HTN (hypertension)  Anxiety, [facility]  Comprehensive Metabolic Panel, Routine collect, 12/27/21 10:18:00 CST, Blood, Stop date 12/27/21 10:18:00 CST, Lab Collect, HTN (hypertension), 12/27/21 10:18:00 CST  Office/Outpatient Visit Level 3 Established [id number] PC, HTN (hypertension)  Anxiety, [facility] - [facility], 12/27/21 10:16:00 CST  ?  2.?Anxiety?F41.9  ?Increase Lexapro to 20 mg daily. ?He will take?2 10 mg tablets of his Lexapro at this time. ?He will notify us?if he desires to?continue his increased dose?or if he returns to 10 mg daily. Side effects discussed which included the possibility of developing suicidal or harmful ideations. ?Patient?verbalized understanding and will?stop the medication and?seek ER care if?heexperiences these problems.  CPX in 3 months.  Ordered:  Clinic Follow up, *Est. 03/27/22 3:00:00 CDT, Wellness, Order for future visit, HTN (hypertension)  Anxiety, [facility]  Office/Outpatient Visit Level 3 Established 19396 PC, HTN (hypertension)  Anxiety, [facility], 12/27/21 10:16:00 CST  ?  Referrals  Clinic Follow up, *Est. 03/27/22 3:00:00 CDT, Wellness, Order for future visit, HTN (hypertension)  Anxiety, [facility]   Problem List/Past Medical History  Ongoing  Anxiety  can lie flat  can walk 2 blocks w/o CP or SOB  hearing loss (rt)  HTN (hypertension)  Hypertriglyceridemia  Hypogonadism male  Immunization due  Low TSH level  Nicotine dependence  JOANIE on CPAP  Prostate cancer screening  Vitamin D deficiency  Wellness examination  Historical  paralymphatic fistula repair right ear 2012  torn Lt ACL repair  Procedure/Surgical History  Arthroscopically aided anterior cruciate ligament repair/augmentation or reconstruction.  (05/22/2014)  Arthroscopy ACL Reconstruction (Left) (05/22/2014)  Injection of anesthetic into peripheral nerve for analgesia (05/22/2014)  Injection, anesthetic agent; femoral nerve, single. (05/22/2014)  Other repair of the cruciate ligaments (05/22/2014)  Paralymphatic fistula repair (2012)  Rt acl reconstruction (1991)   Medications  amlodipine 10 mg oral tablet, 10 mg= 1 tab(s), Oral, Daily, 5 refills  Fish Oil 1200 mg oral capsule, 1200 mg= 1 cap(s), Oral, Daily  Lexapro 10 mg oral tablet, 10 mg= 1 tab(s), Oral, Daily, 5 refills  rosuvastatin 10 mg oral tablet, 10 mg= 1 tab(s), Oral, qPM  testosterone cypionate 100 mg/mL intramuscular solution, See Instructions, 1 refills  valsartan 320 mg oral tablet, See Instructions  Allergies  Minocin?(facial swelling)  Social History  Abuse/Neglect  No, 12/27/2021  No, 11/15/2021  No, 09/08/2021  No, 03/03/2021  No, 11/17/2020  No, 04/06/2020  No, 02/26/2020  No, 01/29/2020  Alcohol  Current, Beer, Daily, 05/19/2014  Employment/School  Employed, Work/School description: management., 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  Home/Environment  Lives with Spouse., 05/19/2014  Nutrition/Health  Regular, 05/19/2014  Substance Use - Denies Substance Abuse, 05/19/2014  Tobacco  Never (less than 100 in lifetime), No, 12/27/2021  Never (less than 100 in lifetime), No, 11/15/2021  Never (less than 100 in lifetime), No, 09/08/2021  Never (less than 100 in lifetime), No, 03/03/2021  Never (less than 100 in lifetime), No, 11/17/2020  Never (less than 100 in lifetime), No, 04/06/2020  Never (less than 100 in lifetime), N/A, 02/26/2020  Never (less than 100 in lifetime), N/A, 01/29/2020  Never (less than 100 in lifetime), No, 03/04/2019  Family History  Osteoarthritis: Mother.  Father: History is negative  Brother: History is negative  Immunizations  Vaccine Date Status   zoster vaccine, inactivated 11/15/2021 Given   influenza virus vaccine, inactivated 11/15/2021 Given   zoster vaccine, inactivated 09/08/2021  Given   COVID-19 mRNA, LNP-S, PF - Moderna 04/09/2021 Recorded   COVID-19 mRNA, LNP-S, PF - Moderna 03/12/2021 Recorded   influenza virus vaccine, inactivated 11/17/2020 Given   influenza virus vaccine, inactivated 01/29/2020 Given   tetanus/diphtheria/pertussis, acel(Tdap) 01/29/2020 Given   Health Maintenance  Health Maintenance  ???Pending?(in the next year)  ??? ??OverDue  ??? ? ? ?Alcohol Misuse Screening due??01/02/21??and every 1??year(s)  ??? ??Due?  ??? ? ? ?ADL Screening due??12/27/21??and every 1??year(s)  ??? ? ? ?Aspirin Therapy for CVD Prevention due??12/27/21??and every 1??year(s)  ??? ? ? ?Depression Screening due??12/27/21??Unknown Frequency  ??? ? ? ?Hypertension Management-Education due??12/27/21??and every 1??year(s)  ??? ??Due In Future?  ??? ? ? ?Obesity Screening not due until??01/01/22??and every 1??year(s)  ??? ? ? ?Hypertension Management-BMP not due until??11/15/22??and every 1??year(s)  ???Satisfied?(in the past 1 year)  ??? ??Satisfied?  ??? ? ? ?Blood Pressure Screening on??12/27/21.??Satisfied by Daphen Delong CMA S.  ??? ? ? ?Body Mass Index Check on??12/27/21.??Satisfied by Daphne Delong CMA S.  ??? ? ? ?Diabetes Screening on??11/15/21.??Satisfied by Moody Haider  ??? ? ? ?Hypertension Management-Blood Pressure on??12/27/21.??Satisfied by Daphne Delong CMA S.  ??? ? ? ?Influenza Vaccine on??11/15/21.??Satisfied by Daphne Delong CMA S.  ??? ? ? ?Lipid Screening on??09/07/21.??Satisfied by Moody Haider  ??? ? ? ?Obesity Screening on??12/27/21.??Satisfied by Daphne Delong CMA  ?      Patient condition discussed?in detail with nurse practitioner.??Agree with plan of care?and follow-up.  ?

## 2022-05-02 NOTE — HISTORICAL [facility] CERNER
This is a historical note converted from Ricarda. Formatting and pictures may have been removed.  Please reference Ricarda for original formatting and attached multimedia. Chief Complaint  WELLNESS  History of Present Illness  Patient is here for ?his?annual wellness -?labs not done, fasting?.?Denies change in social or family history.?  ?   Anxiety: Patient reports problems with daily?anxiety. ?He reports his stress level?is?very high at work. ?He denies any symptoms of depression or suicidal/harmful ideations. ?He has?previously tried?Prozac, Effexor, and?Wellbutrin. ?He denies any side effects of these medications. ?He reports that they just did not improve his anxiety.? He is having difficulty falling asleep and staying asleep at night. ?He is currently using?OTC Benadryl without?much help.  ?   He continues to use his CPAP machine for?sleep apnea. ?He feels he is controlled at this time.  ?   Social Hx: Water:? 4+ glasses/day,? Caff: 2c coffee/day,? ETOH: 2-3 drinks 6 days/week, ?Tob: none, +snuff,? Exercise: 2 days/week - cardio and some weights, ?Occupation: works for [employer] - high stress, ?Marital Status:??, ?Children: 2  ?   Family Hx:  Updated.  ?  Surgical Hx:  Updated.  ?   Specialists:  none  ?   Vaccinations:  Tetanus?- unknown  Flu -?has not received this season  ?  Review of Systems  General:?? Patient reports energy level is ?poor. Denies weight change.??Denies fever,chills, night sweats, or weakness. ?Reports fatigue.  Integument:?? Denies any nevus changes, rashes, urticaria,??or sores.??Also denies itching or areas of numbness.  HEENT:?? Denies vision changes or eye pain.??No sore throat, ear pain, sinus pressure or discharge.  Cardiovascular:?? Denies chest pain, palpitations, dyspnea on exertion, orthopnea.  Respiratory:?? No cough, wheezing, shortness of breath, or sputum.  GI:?? Denies nausea, emesis, constipation, diarrhea, melena, hematochezia or abdominal pain  :?? No  frequency, urgency, hematuria, discharge, or incontinence  MS:?? Denies myalgias, arthralgias, joint effusion, edema, or weakness  Neuro:?? No headaches, numbness in extremities, tingling, dizziness, or weakness  Psych:?? Denies depression, suicidal or homicidal ideations, or irritability  Endo:?? Denies polyuria, polydipsia, polyphagia  Heme:?? No abnormal bleeding or bruising. No lymph node enlargement or pain.  ?  Physical Exam  Vitals & Measurements  HR:?76(Peripheral)? BP:?155/95?  HT:?187?cm? WT:?122.6?kg? BMI:?35.06?  General:?? Well-developed and??nourished, no apparent distress, alert and oriented??4.  Integument:?? Skin is intact with no erythema.??No pustules or vesicles.??No rash or scale. No Lymphadenopathy.??No urticaria.??No abnormal nevi.  HEENT:?? PERRLA, EOMI ; TMs and EACs clear, normal turbinates with no erythema, normal mucosa, no sinus tenderness; no erythema or exudate of mouth or pharynx.  Neck:?? Supple, no lymphadenopathy, no thyromegaly, no bruits, no jugular venous distention.  Cardiovascular:?? Regular rhythm and rate, no murmurs, radial and dorsal pedal pulses 2+ bilaterally.  Respiratory:?? Lungs clear to auscultation bilaterally, no wheezes, no crackles, no rhonchi.??Good air movement.  Abdomen:?? NABS, soft, nontender, no hepatosplenomegaly, no masses, no guarding or rebound.?  MS:?? No muscle tenderness, joints WN L, FROM, negative SLR, no?CCE.  Neuro:?? CN II-XII intact, reflexes 2+ throughout, no motor sensory deficits, negative cerebellar??tests.  Psych: Normal affect, patient appears anxious, good historian, patient answers questions?appropriately. Good hygiene.  Heme:? No bruising or petechiae.  ?  Assessment/Plan  1.?Wellness examination?Z00.00  ?Age-appropriate wellness topics discussed. ?Patient was encouraged to improve his diet and decrease his weight. ?He was also encouraged to?decrease his alcohol intake.  Labs today: CBC, CMP, lipid panel, TSH, UA  2.?Immunization  due?Z23  ?Tdap and influenza vaccines today.  3.?Prostate cancer screening?Z12.5  ?Labs today: PSA.  4.?Elevated blood pressure reading?R03.0  ?Patient will monitor his blood pressure twice daily at home?and?log it.? Patient will bring his log to the follow-up appointment in?4 weeks.  5.?Anxiety?F41.9  ?Start Cymbalta?30 mg 1 p.o. daily x1 week then increase to 2?caps daily. ?Side effects discussed which included the risk of developing suicidal or harmful ideations. ?If the?patient develops these feelings, he is to stop the medication and report to the nearest ER.? He verbalized understanding?and all questions were answered.  BuSpar 7.5 mg 1 p.o. 3 times daily as needed anxiety.  Follow-up office visit in 4 weeks.  6.?Hypertriglyceridemia?E78.1  ?Labs today: Lipid panel.  7.?Sleep apnea?G47.30  ?Continue CPAP use.  8.?Fatigue?R53.83  ?Labs today:?Testosterone level, TSH, vitamin D  Orders:  1170F Functional Status Assessment, 01/29/20 9:55:00 CST  3008F Body Mass Index (BMI), documented, 01/29/20 9:55:00 CST  3077F Most recent systolic blood pressure, greater than or equal to 140 mm Hg, 01/29/20 9:55:00 CST  3080F Most recent diastolic blood pressure, greater than or equal to 90 mm Hg, 01/29/20 9:55:00 CST  Referrals  Clinic Follow up, *Est. 02/26/20 8:15:00 CST, Order for future visit, Anxiety, [facility]   Problem List/Past Medical History  Ongoing  can lie flat  can walk 2 blocks w/o CP or SOB  hearing loss (rt)  Hypertriglyceridemia  Low TSH level  Wellness examination  Historical  paralymphatic fistula repair right ear 2012  torn Lt ACL repair  Procedure/Surgical History  Arthroscopically aided anterior cruciate ligament repair/augmentation or reconstruction. (05/22/2014)  Arthroscopy ACL Reconstruction (Left) (05/22/2014)  Injection of anesthetic into peripheral nerve for analgesia (05/22/2014)  Injection, anesthetic agent; femoral nerve, single. 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  Father: History is negative  Brother: History is negative  Immunizations  Vaccine Date Status   influenza virus vaccine, inactivated 01/29/2020 Given   tetanus/diphtheria/pertussis, acel(Tdap) 01/29/2020 Given   Health Maintenance  Health Maintenance  ???Pending?(in the next year)  ??? ??OverDue  ??? ? ? ?Diabetes Screening due??and every?  ??? ??Due?  ??? ? ? ?Alcohol Misuse Screening due??01/01/20??and every 1??year(s)  ??? ? ? ?ADL Screening due??01/30/20??and every 1??year(s)  ??? ? ? ?Aspirin Therapy for CVD Prevention due??01/30/20??and every 1??year(s)  ??? ? ? ?Depression Screening due??01/30/20??and every?  ??? ??Due In Future?  ??? ? ? ?Obesity Screening not due until??01/01/21??and every 1??year(s)  ??? ? ? ?Blood Pressure Screening not due until??01/28/21??and every 1??year(s)  ??? ? ? ?Body Mass Index Check not due until??01/28/21??and every 1??year(s)  ???Satisfied?(in the past 1 year)  ??? ??Satisfied?  ??? ? ? ?Blood Pressure Screening on??01/29/20.??Satisfied by Daphne Delong CMA  ??? ? ? ?Body Mass Index Check on??01/29/20.??Satisfied by Daphne Delong CMA  ??? ? ? ?Diabetes Screening  on??01/29/20.??Satisfied by Cesia Regan  ??? ? ? ?Influenza Vaccine on??01/29/20.??Satisfied by Daphne Delong CMA  ??? ? ? ?Lipid Screening on??01/29/20.??Satisfied by Cesia Regan  ??? ? ? ?Obesity Screening on??01/29/20.??Satisfied by Daphne Delong CMA  ??? ? ? ?Tetanus Vaccine on??01/29/20.??Satisfied by Daphne Delong CMA.  ?      Patient condition discussed?in detail with nurse practitioner.??Agree with plan of care?and follow-up.  ?

## 2022-05-02 NOTE — HISTORICAL [facility]
This is a historical note converted from Ricarda. Formatting and pictures may have been removed.  Please reference Ricarda for original formatting and attached multimedia. Chief Complaint  CPX  History of Present Illness  Patient presents for his wellness exam.  , 2 children.? 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 In process of building a house, should be finished in 2 weeks.  Nicotine 1/2 can dip a day. Walks 30 minutes a day. ?EtOH:?2 drinks several days a week.  ?   Last Sleep Study in March 2020.  ?  GI: [place], colonoscopy on? 12/10/2020  Review of Systems  General:??????????????? Patient reports energy level is good.???Denies fever,chills, night sweats, fatigue or weakness.  Integument:???????? Denies any nevus changes, rashes, urticaria, ?or sores.? Also denies itching or areas of numbness.  HEENT:?????????????????? Denies vision changes or eye pain.? No sore throat, ear pain, sinus pressure or discharge.  Cardiovascular:??? BP has been good at home.? Takes med in morning. Denies chest pain, palpitations, dyspnea on exertion, orthopnea.  Respiratory:???????? No cough, wheezing, shortness of breath, or sputum.? He continues to use his CPAP machine for?sleep apnea. ?He feels he is controlled at this time.  GI:????????????????????????? Denies nausea, emesis, constipation, diarrhea, melena, hematochezia or abdominal pain  :??????????????????????? No frequency, urgency, hematuria, discharge, or incontinence  MS:?????????????????????? Denies myalgias, arthralgias, joint effusion, edema, or weakness  Neuro:????????????????? No headaches, numbness in extremities, tingling, dizziness, or weakness  Psych:?????????????????? Denies anxiety, depression, suicidal or homicidal ideations, or irritability  Endo:??????????????????? Denies polyuria, polydipsia, polyphagia  Heme:????????????????? No abnormal bleeding or bruising. No lymph node enlargement or pain.  Physical Exam  Vitals & Measurements  HR:?101(Peripheral)?  BP:?150/110?  HT:?187.00?cm? HT:?187?cm? WT:?132.700?kg? WT:?132.7?kg? BMI:?37.95?  BP is?140/93 on my check? Pulse 90.? ( had dip tobacco 45 minutes prior to initial BP check)  General :?????Well-developed, obese white male in no apparent distress, alert and oriented ?4  Integument :????? Skin is intact with no erythema.? No pustules or vesicles.? No rash or scale. No Lymphadenopathy.? No urticaria.? No abnormal nevi  HEENT :?????YADIRA, EOMI ; TMs and EACs clear, normal turbinates with no erythema, normal mucosa, no sinus tenderness; no erythema or exudate of mouth or pharynx  Neck :?????Supple, no lymphadenopathy, no thyromegaly, no bruits, no jugular venous distention  Cardiovascular :?????? Regular rhythm and rate, no murmurs, radial and dorsal pedal pulses 2+ bilaterally  Respiratory :??????Lungs clear to auscultation bilaterally, no wheezes, no crackles, no rhonchi.? Good air movement  Abdomen :?????? ?NABS, soft, nontender, no hepatosplenomegaly, no masses, no guarding or rebound??????????????????????????  MS :??????? No muscle tenderness, joints WNL, FROM, negative SLR, no?CCE  Neuro :? ?????? CN II-XII intact, reflexes 2+ throughout, no motor sensory deficits, negative?cerebellar? tests  Psych :??????Normal affect, patient calm, good historian, patient answers questions??? appropriately.???????  Heme :?????? No bruising or petechiae?  Assessment/Plan  1.?Wellness examination?Z00.00  ?Age-appropriate wellness topics discussed.? Had colonoscopy in 2020, due in 2027 due to polyps  2.?HTN (hypertension)?I10  ?He will monitor blood pressure at home and contact us if it is consistently greater than 140/90.? Refilled valsartan 160 mg for 6 months.? Strongly encouraged to?stop nicotine use.  3.?Hypogonadism male?E29.1  ?Testosterone level pending, he gave himself a shot on 3-1-21 and level done on 3-2-21.? Has new acne on chest. He is interested in cutting dose.  4.?Vitamin D deficiency?E55.9  ?Vitamin D level  pending  5.?Hypertriglyceridemia?E78.1  ?Lipids are well controlled. ?He will continue omega-3 supplementation.  6.?Anxiety?F41.9  ?Well-controlled, refilled duloxetine 60 mg for 6 months.  7.?Low TSH level?R79.89  ?TSH level has been low, it?is currently?in the normal range, we will follow.  8.?JOANIE on CPAP?G47.33  9.?Nicotine dependence?F17.200  Instructed on adverse health consequences of nicotine use.? Educated on?ways to quit, including?pharmaceutical regimens.? Given prescriptions for 3 months of Chantix, he will contact us if he has questions or concerns  Referrals  Clinic Follow up, *Est. 09/08/21 8:15:00 CDT, Order for future visit, HTN (hypertension), [facility]   Problem List/Past Medical History  Ongoing  Anxiety  can lie flat  can walk 2 blocks w/o CP or SOB  hearing loss (rt)  HTN (hypertension)  Hypertriglyceridemia  Hypogonadism male  Low TSH level  Vitamin D deficiency  Wellness examination  Historical  paralymphatic fistula repair right ear 2012  torn Lt ACL repair  Procedure/Surgical History  Arthroscopically aided anterior cruciate ligament repair/augmentation or reconstruction. (05/22/2014)  Arthroscopy ACL Reconstruction (Left) (05/22/2014)  Injection of anesthetic into peripheral nerve for analgesia (05/22/2014)  Injection, anesthetic agent; femoral nerve, single. (05/22/2014)  Other repair of the cruciate ligaments (05/22/2014)  Paralymphatic fistula repair (2012)  Rt acl reconstruction (1991)   Medications  Chantix Continuing Month 1 mg oral tablet, 1 mg= 1 tab(s), Oral, BID, 1 refills  Chantix Starter Pack 0.5 mg-1 mg oral tablet, 1 tab(s), Oral, BID  DULoxetine 60 mg oral delayed release capsule, See Instructions, 1 refills  Fish Oil 1200 mg oral capsule, 1200 mg= 1 cap(s), Oral, Daily  testosterone cypionate 100 mg/mL intramuscular solution, See Instructions, 1 refills  valsartan 160 mg oral tablet, 160 mg= 1 tab(s), Oral, Daily, 1 refills  Allergies  Minocin?(facial swelling)  Social  History  Abuse/Neglect  No, 03/03/2021  No, 11/17/2020  No, 04/06/2020  No, 02/26/2020  No, 01/29/2020  Alcohol  Current, Beer, Daily, 05/19/2014  Employment/School  Employed, Work/School description: management., 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  Father: History is negative  Brother: History is negative  Immunizations  Vaccine Date Status   influenza virus vaccine, inactivated 11/17/2020 Given   influenza virus vaccine, inactivated 01/29/2020 Given   tetanus/diphtheria/pertussis, acel(Tdap) 01/29/2020 Given   Health Maintenance  Health Maintenance  ???Pending?(in the next year)  ??? ??OverDue  ??? ? ? ?Alcohol Misuse Screening due??01/02/21??and every 1??year(s)  ??? ??Due?  ??? ? ? ?ADL Screening due??03/08/21??and every 1??year(s)  ??? ? ? ?Aspirin Therapy for CVD Prevention due??03/08/21??and every 1??year(s)  ??? ? ? ?Depression Screening due??03/08/21??Unknown Frequency  ??? ? ? ?Hypertension Management-Education due??03/08/21??and every 1??year(s)  ??? ? ? ?Zoster Vaccine due??03/08/21??Unknown Frequency  ??? ??Due In Future?  ??? ? ? ?Influenza Vaccine not due until??10/01/21??and every 1??day(s)  ??? ? ? ?Obesity Screening not due until??01/01/22??and every 1??year(s)  ??? ? ? ?Hypertension Management-BMP not due until??03/02/22??and every 1??year(s)  ??? ? ? ?Blood Pressure Screening not due until??03/03/22??and every 1??year(s)  ??? ? ? ?Body Mass Index Check not due until??03/03/22??and every 1??year(s)  ??? ? ? ?Hypertension  Management-Blood Pressure not due until??03/03/22??and every 1??year(s)  ???Satisfied?(in the past 1 year)  ??? ??Satisfied?  ??? ? ? ?Blood Pressure Screening on??03/03/21.??Satisfied by Sindi POE Daphne S.  ??? ? ? ?Body Mass Index Check on??03/03/21.??Satisfied by Sindi POE Daphne S.  ??? ? ? ?Colorectal Screening on??12/10/20.??Satisfied by Mikaela Mccabe  ??? ? ? ?Diabetes Screening on??03/02/21.??Satisfied by Moody Haider  ??? ? ? ?Hypertension Management-Blood Pressure on??03/03/21.??Satisfied by Sindi POE Daphne S.  ??? ? ? ?Influenza Vaccine on??11/17/20.??Satisfied by Sindi POE Daphne S.  ??? ? ? ?Lipid Screening on??03/02/21.??Satisfied by Moody Haider  ??? ? ? ?Obesity Screening on??03/03/21.??Satisfied by Sindi POE Daphne S.  ?  Lab Results  Test Name Test Result Date/Time Comments   Glucose Lvl 100.0 mg/dL 03/02/2021 09:06 CST    BUN 18.0 mg/dL 03/02/2021 09:06 CST    Creatinine 1.10 mg/dL 03/02/2021 09:06 CST    AST 27.0 unit/L 03/02/2021 09:06 CST    ALT 27.0 unit/L 03/02/2021 09:06 CST    PSA 0.93 ng/mL 03/02/2021 09:06 CST EXPECTED RESULTS:  AGE 40 - 49 . . . . . . . . . . . . . . . . . 0 - 2.5 ng/ml.  AGE 50 - 59 . . . . . . . . . . . . . . . . . 0 - 3.5 ng/ml.  AGE 60 - 69 . . . . . . . . . . . . . . . . . 0 - 4.5 ng/ml.  AGE 70 - 79 . . . . . . . . . . . . . . . . . 0 - 6.5 ng/ml.  ?  The  Total PSA assay is a Chemiluminescent Microparticle Immunoassay (CMIA)   Chol 169.0 mg/dL 03/02/2021 09:06 CST    HDL 43.0 mg/dL 03/02/2021 09:06 CST    Trig 124.0 mg/dL 03/02/2021 09:06 CST    .0 mg/dL 03/02/2021 09:06 CST    Chol/HDL 3.9 03/02/2021 09:06 CST    TSH 1.733 mIU/mL 03/02/2021 09:06 CST    Hgb 15.3 gm/dL 03/02/2021 09:06 CST    Hct 46.2 % 03/02/2021 09:06 CST

## 2022-05-02 NOTE — HISTORICAL [facility]
This is a historical note converted from [employer]. Formatting and pictures may have been removed.  Please reference Ricarda for original formatting and attached multimedia. Chief Complaint  HTN FU  History of Present Illness  Patient presents for a 2-month follow-up.? His blood pressure remains uncontrolled. At home, his blood pressure will range from normal to abnormal. He denies any side effects with his current medications.  ?  He started testosterone cypionate?at 50 mg weekly.? He has noticed an improvement in his fatigue and libido since restarting the medication. ?It has not caused acne?at this dose.  ?  He continues to report high levels of stress?that?will?definitely continue?over the next 11 months. He has?previously tried?Prozac, Effexor, Cymbalta and?Wellbutrin to help with stress and anxiety.??He denies any side effects with these medications. ?He reports that they just did not improve his anxiety.?  His stress comes from a lot of multitasking that is required at work and worry.? He does not have problems concentrating or completing tasks.  ?  He was referred to?Dr. Ventura,?cardiologist, due to his?calcium score of 229?with calcifications found in?the LAD?and right coronary artery.? He reports a recent stress test was found to be ok.  Review of Systems  General:??? Denies weight change.??Denies fever,chills, night sweats, or weakness. ?  Cardiovascular:?? Denies chest pain, palpitations, dyspnea on exertion, orthopnea.  Respiratory:?? No cough, wheezing, shortness of breath, or sputum.  GI:?? Denies nausea, emesis, constipation, diarrhea, melena, hematochezia or abdominal pain  :?? No frequency, urgency, hematuria, discharge, or incontinence  MS:?? Denies myalgias, arthralgias, joint effusion, edema, or weakness  Neuro:?? No headaches, numbness in extremities, tingling, dizziness, or weakness  Psych:?? Denies?depression, suicidal or homicidal ideations, or irritability  Endo:?? Denies polyuria, polydipsia,  polyphagia  Heme:?? No abnormal bleeding or bruising. No lymph node enlargement or pain.  ?  Physical Exam  Vitals & Measurements  HR:?98(Peripheral)? BP:?152/98?  ?  General:?? Well-developed and??nourished, no apparent distress, alert and oriented??4.  Neck:?? Supple, no lymphadenopathy, no thyromegaly, no bruits, no jugular venous distention.  Cardiovascular:?? Regular rhythm and rate, no murmurs, radial and dorsal pedal pulses 2+ bilaterally.  Respiratory:?? Lungs clear to auscultation bilaterally, no wheezes, no crackles, no rhonchi.??Good air movement.  Abdomen:?? NABS, soft, nontender, no hepatosplenomegaly, no masses, no guarding or rebound.?  MS:?? No CCE.?_  Neuro:?? CN II-XII intact_  Psych: Normal affect, patient calm, good historian, patient answers questions?appropriately. Good hygiene.  Heme:? No bruising or petechiae.  ?  Assessment/Plan  1.?HTN (hypertension)?I10  Continue valsartan 320 mg?daily.  Increase amlodipine to 5 mg twice a day.  He will continue to monitor his blood pressure twice a day and notify us if it remains greater than 140/90.  Follow-up office visit in 4 weeks.  Ordered:  amLODIPine, 5 mg = 1 tab(s), Oral, BID, # 30 tab(s), 5 Refill(s), Pharmacy: [facility]/pharmacy #[id number], 187, cm, Height/Length Dosing, 09/08/21 8:19:00 CDT, 128.9, kg, Weight Dosing, 09/08/21 8:19:00 CDT  Clinic Follow up, *Est. 12/13/21 8:45:00 CST, Order for future visit, Hypogonadism male, ink AFP  Office/Outpatient Visit Level 4 Established 42402 PC, HTN (hypertension)  Hypogonadism male  Anxiety, [facility] AMB - AFP, 11/15/21 9:47:00 CST  ?  2.?Anxiety?F41.9  ?Lexapro 10 mg one p.o. daily #30x1. Side effects discussed which included the possibility of developing suicidal or harmful ideations. ?Patient?verbalized understanding and will?stop the medication and?seek ER care if?heexperiences these problems.  Continue BuSpar as needed.  Ordered:  escitalopram, 10 mg = 1 tab(s), Oral, Daily, # 30 tab(s), 1 Refill(s),  Pharmacy: [facility]/pharmacy #[id number], 187, cm, Height/Length Dosing, 09/08/21 8:19:00 CDT, 128.9, kg, Weight Dosing, 09/08/21 8:19:00 CDT  Clinic Follow up, *Est. 12/13/21 8:45:00 CST, Order for future visit, Hypogonadism male, [facility]Link AFP  Office/Outpatient Visit Level 4 Established 79292 PC, HTN (hypertension)  Hypogonadism male  Anxiety, [facility] AMB - AFP, 11/15/21 9:47:00 CST  ?  3.?Hypogonadism male?E29.1  ?Lab today: testosterone level, CBC, PSA  Ordered:  Clinic Follow up, *Est. 12/13/21 8:45:00 CST, Order for future visit, Hypogonadism male, HLink AFP  Office/Outpatient Visit Level 4 Established 95835 PC, HTN (hypertension)  Hypogonadism male  Anxiety, [facility] AMB - AFP, 11/15/21 9:47:00 CST  ?  4.?Immunization due?Z23  ?Influenza vaccine today.  Shingrix dose #2 of two today.  ?  Referrals  Clinic Follow up, *Est. 12/13/21 8:45:00 CST, Order for future visit, Hypogonadism male, [facility]Link AFP   Problem List/Past Medical History  Ongoing  Anxiety  can lie flat  can walk 2 blocks w/o CP or SOB  hearing loss (rt)  HTN (hypertension)  Hypertriglyceridemia  Hypogonadism male  Immunization due  Low TSH level  Nicotine dependence  JOANIE on CPAP  Prostate cancer screening  Vitamin D deficiency  Wellness examination  Historical  paralymphatic fistula repair right ear 2012  torn Lt ACL repair  Procedure/Surgical History  Arthroscopically aided anterior cruciate ligament repair/augmentation or reconstruction. (05/22/2014)  Arthroscopy ACL Reconstruction (Left) (05/22/2014)  Injection of anesthetic into peripheral nerve for analgesia (05/22/2014)  Injection, anesthetic agent; femoral nerve, single. (05/22/2014)  Other repair of the cruciate ligaments (05/22/2014)  Paralymphatic fistula repair (2012)  Rt acl reconstruction (1991)   Medications  amlodipine 5 mg oral tablet, 5 mg= 1 tab(s), Oral, BID, 5 refills  Fish Oil 1200 mg oral capsule, 1200 mg= 1 cap(s), Oral, Daily  Lexapro 10 mg oral tablet, 10 mg= 1 tab(s), Oral, Daily, 1  refills  rosuvastatin 10 mg oral tablet, 10 mg= 1 tab(s), Oral, qPM  testosterone cypionate 100 mg/mL intramuscular solution, See Instructions, 1 refills  valsartan 320 mg oral tablet, 320 mg= 1 tab(s), Oral, Daily  Allergies  Minocin?(facial swelling)  Social History  Abuse/Neglect  No, 11/15/2021  No, 09/08/2021  No, 03/03/2021  No, 11/17/2020  No, 04/06/2020  No, 02/26/2020  No, 01/29/2020  Alcohol  Current, Beer, Daily, 05/19/2014  Employment/School  Employed, Work/School description: management., 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  Home/Environment  Lives with Spouse., 05/19/2014  Nutrition/Health  Regular, 05/19/2014  Substance Use - Denies Substance Abuse, 05/19/2014  Tobacco  Never (less than 100 in lifetime), No, 11/15/2021  Never (less than 100 in lifetime), No, 09/08/2021  Never (less than 100 in lifetime), No, 03/03/2021  Never (less than 100 in lifetime), No, 11/17/2020  Never (less than 100 in lifetime), No, 04/06/2020  Never (less than 100 in lifetime), N/A, 02/26/2020  Never (less than 100 in lifetime), N/A, 01/29/2020  Never (less than 100 in lifetime), No, 03/04/2019  Family History  Osteoarthritis: Mother.  Father: History is negative  Brother: History is negative  Immunizations  Vaccine Date Status   zoster vaccine, inactivated 11/15/2021 Given   influenza virus vaccine, inactivated 11/15/2021 Given   zoster vaccine, inactivated 09/08/2021 Given   COVID-19 mRNA, LNP-S, PF - Moderna 04/09/2021 Recorded   COVID-19 mRNA, LNP-S, PF - Moderna 03/12/2021 Recorded   influenza virus vaccine, inactivated 11/17/2020 Given   influenza virus vaccine, inactivated 01/29/2020 Given   tetanus/diphtheria/pertussis, acel(Tdap) 01/29/2020 Given   Health Maintenance  Health Maintenance  ???Pending?(in the next year)  ??? ??OverDue  ??? ? ? ?Alcohol Misuse Screening due??01/02/21??and every 1??year(s)  ??? ??Due?  ??? ? ? ?ADL Screening due??11/15/21??and every 1??year(s)  ??? ? ? ?Aspirin Therapy for CVD Prevention due??11/15/21??and every  1??year(s)  ??? ? ? ?Depression Screening due??11/15/21??Unknown Frequency  ??? ? ? ?Hypertension Management-Education due??11/15/21??and every 1??year(s)  ??? ??Due In Future?  ??? ? ? ?Obesity Screening not due until??01/01/22??and every 1??year(s)  ??? ? ? ?Body Mass Index Check not due until??09/08/22??and every 1??year(s)  ???Satisfied?(in the past 1 year)  ??? ??Satisfied?  ??? ? ? ?Blood Pressure Screening on??11/15/21.??Satisfied by Viri Regan NP  ??? ? ? ?Body Mass Index Check on??09/08/21.??Satisfied by Daphne Delong CMA.  ??? ? ? ?Colorectal Screening on??12/10/20.??Satisfied by Mikaela Mccabe  ??? ? ? ?Diabetes Screening on??11/15/21.??Satisfied by Moody Haider  ??? ? ? ?Hypertension Management-BMP on??11/15/21.??Satisfied by Moody Haider  ??? ? ? ?Influenza Vaccine on??11/15/21.??Satisfied by Daphne Delong CMA.  ??? ? ? ?Lipid Screening on??09/07/21.??Satisfied by Moody Haider  ??? ? ? ?Obesity Screening on??09/08/21.??Satisfied by Daphne Delong CMA.  ?      Patient condition discussed?in detail with nurse practitioner.??Agree with plan of care?and follow-up.  ?

## 2022-09-07 PROBLEM — E66.01 SEVERE OBESITY (BMI 35.0-39.9) WITH COMORBIDITY: Status: ACTIVE | Noted: 2022-09-07

## 2022-09-07 PROBLEM — E78.1 HYPERTRIGLYCERIDEMIA: Status: ACTIVE | Noted: 2022-09-07

## 2022-09-07 PROBLEM — G47.09 OTHER INSOMNIA: Status: ACTIVE | Noted: 2022-09-07

## 2022-09-07 PROBLEM — G47.33 OBSTRUCTIVE SLEEP APNEA SYNDROME: Status: ACTIVE | Noted: 2022-09-07

## 2022-09-07 PROBLEM — F17.200 NICOTINE DEPENDENCE: Status: ACTIVE | Noted: 2022-09-07

## 2022-09-07 PROBLEM — I10 HYPERTENSION: Status: ACTIVE | Noted: 2022-09-07

## 2022-09-07 PROBLEM — R79.89 DECREASED THYROID STIMULATING HORMONE (TSH) LEVEL: Status: ACTIVE | Noted: 2022-09-07

## 2022-09-07 PROBLEM — E55.9 VITAMIN D DEFICIENCY: Status: ACTIVE | Noted: 2022-09-07

## 2022-09-07 PROBLEM — F41.9 ANXIETY: Status: ACTIVE | Noted: 2022-09-07

## 2022-09-07 PROBLEM — E29.1 HYPOGONADISM IN MALE: Status: ACTIVE | Noted: 2022-09-07

## 2022-09-07 PROBLEM — H91.91 HEARING LOSS OF RIGHT EAR: Status: ACTIVE | Noted: 2022-09-07

## 2022-09-19 ENCOUNTER — HOSPITAL ENCOUNTER (OUTPATIENT)
Dept: RADIOLOGY | Facility: CLINIC | Age: 52
Discharge: HOME | End: 2022-09-19
Attending: ORTHOPAEDIC SURGERY
Payer: COMMERCIAL

## 2022-09-19 ENCOUNTER — OFFICE VISIT (OUTPATIENT)
Dept: ORTHOPEDICS | Facility: CLINIC | Age: 52
End: 2022-09-19
Payer: COMMERCIAL

## 2022-09-19 VITALS — WEIGHT: 277 LBS | BODY MASS INDEX: 36.71 KG/M2 | HEIGHT: 73 IN

## 2022-09-19 DIAGNOSIS — G89.29 CHRONIC RIGHT SHOULDER PAIN: ICD-10-CM

## 2022-09-19 DIAGNOSIS — M25.511 CHRONIC RIGHT SHOULDER PAIN: ICD-10-CM

## 2022-09-19 DIAGNOSIS — M19.011 PRIMARY OSTEOARTHRITIS OF RIGHT SHOULDER: Primary | ICD-10-CM

## 2022-09-19 PROCEDURE — 99203 PR OFFICE/OUTPT VISIT, NEW, LEVL III, 30-44 MIN: ICD-10-PCS | Mod: 25,,, | Performed by: ORTHOPAEDIC SURGERY

## 2022-09-19 PROCEDURE — 4010F ACE/ARB THERAPY RXD/TAKEN: CPT | Mod: CPTII,,, | Performed by: ORTHOPAEDIC SURGERY

## 2022-09-19 PROCEDURE — 20610 LARGE JOINT ASPIRATION/INJECTION: R SUBACROMIAL BURSA: ICD-10-PCS | Mod: RT,,, | Performed by: ORTHOPAEDIC SURGERY

## 2022-09-19 PROCEDURE — 3008F PR BODY MASS INDEX (BMI) DOCUMENTED: ICD-10-PCS | Mod: CPTII,,, | Performed by: ORTHOPAEDIC SURGERY

## 2022-09-19 PROCEDURE — 4010F PR ACE/ARB THEARPY RXD/TAKEN: ICD-10-PCS | Mod: CPTII,,, | Performed by: ORTHOPAEDIC SURGERY

## 2022-09-19 PROCEDURE — 73030 X-RAY EXAM OF SHOULDER: CPT | Mod: RT,,, | Performed by: ORTHOPAEDIC SURGERY

## 2022-09-19 PROCEDURE — 73030 XR SHOULDER COMPLETE 2 OR MORE VIEWS RIGHT: ICD-10-PCS | Mod: RT,,, | Performed by: ORTHOPAEDIC SURGERY

## 2022-09-19 PROCEDURE — 99203 OFFICE O/P NEW LOW 30 MIN: CPT | Mod: 25,,, | Performed by: ORTHOPAEDIC SURGERY

## 2022-09-19 PROCEDURE — 3008F BODY MASS INDEX DOCD: CPT | Mod: CPTII,,, | Performed by: ORTHOPAEDIC SURGERY

## 2022-09-19 PROCEDURE — 1159F PR MEDICATION LIST DOCUMENTED IN MEDICAL RECORD: ICD-10-PCS | Mod: CPTII,,, | Performed by: ORTHOPAEDIC SURGERY

## 2022-09-19 PROCEDURE — 1159F MED LIST DOCD IN RCRD: CPT | Mod: CPTII,,, | Performed by: ORTHOPAEDIC SURGERY

## 2022-09-19 PROCEDURE — 20610 DRAIN/INJ JOINT/BURSA W/O US: CPT | Mod: RT,,, | Performed by: ORTHOPAEDIC SURGERY

## 2022-09-19 RX ORDER — MELOXICAM 15 MG/1
15 TABLET ORAL DAILY
Qty: 30 TABLET | Refills: 3 | Status: SHIPPED | OUTPATIENT
Start: 2022-09-19

## 2022-09-19 RX ORDER — LIDOCAINE HYDROCHLORIDE 20 MG/ML
5 INJECTION, SOLUTION EPIDURAL; INFILTRATION; INTRACAUDAL; PERINEURAL
Status: DISCONTINUED | OUTPATIENT
Start: 2022-09-19 | End: 2022-09-19 | Stop reason: HOSPADM

## 2022-09-19 RX ORDER — BETAMETHASONE SODIUM PHOSPHATE AND BETAMETHASONE ACETATE 3; 3 MG/ML; MG/ML
6 INJECTION, SUSPENSION INTRA-ARTICULAR; INTRALESIONAL; INTRAMUSCULAR; SOFT TISSUE
Status: DISCONTINUED | OUTPATIENT
Start: 2022-09-19 | End: 2022-09-19 | Stop reason: HOSPADM

## 2022-09-19 RX ADMIN — BETAMETHASONE SODIUM PHOSPHATE AND BETAMETHASONE ACETATE 6 MG: 3; 3 INJECTION, SUSPENSION INTRA-ARTICULAR; INTRALESIONAL; INTRAMUSCULAR; SOFT TISSUE at 10:09

## 2022-09-19 RX ADMIN — LIDOCAINE HYDROCHLORIDE 5 ML: 20 INJECTION, SOLUTION EPIDURAL; INFILTRATION; INTRACAUDAL; PERINEURAL at 10:09

## 2022-09-19 NOTE — PROCEDURES
Large Joint Aspiration/Injection: R subacromial bursa    Date/Time: 9/19/2022 10:00 AM  Performed by: Vern Gibbs Jr., MD  Authorized by: Vern Gibbs Jr., MD     Consent Done?:  Yes (Verbal)  Indications:  Arthritis  Site marked: the procedure site was marked    Timeout: prior to procedure the correct patient, procedure, and site was verified    Prep: patient was prepped and draped in usual sterile fashion      Local anesthesia used?: Yes    Local anesthetic:  Topical anesthetic    Details:  Needle Size:  21 G  Ultrasonic Guidance for needle placement?: No    Approach:  Posterior  Location:  Shoulder  Site:  R subacromial bursa  Medications:  5 mL LIDOcaine (PF) 20 mg/mL (2%) 20 mg/mL (2 %); 6 mg betamethasone acetate-betamethasone sodium phosphate 6 mg/mL  Patient tolerance:  Patient tolerated the procedure well with no immediate complications

## 2022-09-19 NOTE — PROGRESS NOTES
Chief Complaint:   Chief Complaint   Patient presents with    Right Shoulder - Pain    Pain     Right shoulder pain, patient states he didnt have an injury or sx on this side but the pain has been persistant for the last few years and states every now and then itll catch his ROM isnt too great       Consulting Physician: Viri Regan NP    History of present illness:    he is a pleasant 51 y.o. year old male who has developed right shoulder pain over the last few years.  The pain is located anteriorly and, globally around the shoulder.  He knows it worse with motion.  It is somewhat better with rest.  He will occasionally have the shoulder catch.  He denies any numbness or tingling.  He thinks he may have injured it in the distant past.    History reviewed. No pertinent past medical history.    Past Surgical History:   Procedure Laterality Date    Arthroscopically aided anterior cruciate ligament repair/augmentation or reconstruction. (05/22/2014)      Arthroscopy ACL Reconstruction (Left) (05/22/2014)      Injection of anesthetic into peripheral nerve for analgesia (05/22/2014)      Injection, anesthetic agent; femoral nerve, single. (05/22/2014)      Other repair of the cruciate ligaments (05/22/2014)      Paralymphatic fistula repair (2012)      Rt acl reconstruction (1991)         Current Outpatient Medications   Medication Sig    amLODIPine (NORVASC) 10 MG tablet Take 1 tablet (10 mg total) by mouth once daily.    diazePAM (VALIUM) 5 MG tablet Take 1 tablet (5 mg total) by mouth nightly as needed for Anxiety or Insomnia.    EScitalopram oxalate (LEXAPRO) 20 MG tablet Take 1 tablet (20 mg total) by mouth once daily.    rosuvastatin (CRESTOR) 10 MG tablet Take 10 mg by mouth once daily.    testosterone cypionate (DEPOTESTOTERONE CYPIONATE) 100 mg/mL injection Inject 1 mL (100 mg total) into the muscle once a week.    traZODone (DESYREL) 50 MG tablet Take 1 tablet (50 mg total) by mouth nightly as needed  "for Insomnia.    valsartan (DIOVAN) 320 MG tablet Take 1 tablet (320 mg total) by mouth once daily.    varenicline (CHANTIX CONTINUING MONTH BOX) 1 mg Tab Take 1 tablet (1 mg total) by mouth 2 (two) times daily.    varenicline (CHANTIX STARTING MONTH BOX) 0.5 mg (11)- 1 mg (42) tablet Take one 0.5mg tab by mouth once daily X3 days,then increase to one 0.5mg tab twice daily X4 days,then increase to one 1mg tab twice daily    meloxicam (MOBIC) 15 MG tablet Take 1 tablet (15 mg total) by mouth once daily.     No current facility-administered medications for this visit.       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  HENT:   Denies headaches or blurry vision.  Cardiovascular:  Denies chest pain or irregular heart beat.  Respiratory:   Denies cough or shortness of breath.  Gastrointestinal:  Denies abdominal pain, nausea, or vomiting.  Musculoskeletal:   Denies muscle cramps.  Neurological:   Denies dizziness or focal weakness.  Psychiatric/Behavior: Normal mental status.  Hematology/Lymph:  Denies bleeding problem or easy bruising/bleeding.  Skin:    Denies rash or suspicious lesions.    Examination:    Vital Signs:    Vitals:    09/19/22 1015   Weight: 125.6 kg (277 lb)   Height: 6' 1" (1.854 m)       Body mass index is 36.55 kg/m².    Constitution:   Well-developed, well nourished patient in no acute distress.  Neurological:   Alert and oriented x 3 and " cooperative to examination.     Psychiatric/Behavior: Normal mental status.  Respiratory:   No shortness of breath.  Eyes:    Extraoccular muscles intact  Skin:    No scars, rash or suspicious lesions.    MSK:   Shoulder Exam:                   Right        Left  Skin:                                   Normal     Normal  AC joint tenderness:           None         None  Forward Flexion:                150            180  Abduction:                          100           180  External Rotation:               80              80  Internal Rotation:                80             80  Supraspinatus stress test: Neg           Neg  Hawkin's Impingement:       +           Neg  Neer Impingement:              +           Neg  Apprehension:                   Neg           Neg  Charleston's:                             +         Neg  Speed's test:                     Neg            Neg  Strength:  External Rotation:           5/5                5/5  Lift Off/belly press:          5/5                5/5    N-V status:                   Intact             Intact    C-spine: Normal ROM, NT      Imaging: X-rays ordered and images interpreted today personally by me of four views of the right shoulder show glenohumeral arthritis        Assessment: Primary osteoarthritis of right shoulder  -     Ambulatory referral/consult to Orthopedics  -     X-ray Shoulder 2 or More Views Right; Future; Expected date: 09/19/2022    Other orders  -     meloxicam (MOBIC) 15 MG tablet; Take 1 tablet (15 mg total) by mouth once daily.  Dispense: 30 tablet; Refill: 3        Plan:  Will start anti-inflammatory medicines, I will give him a home exercise program, will also try an injection.  If his pain persists will get MRI and he could be a candidate for arthroscopic debridement/biceps tenodesis